# Patient Record
Sex: FEMALE
[De-identification: names, ages, dates, MRNs, and addresses within clinical notes are randomized per-mention and may not be internally consistent; named-entity substitution may affect disease eponyms.]

---

## 2019-02-27 ENCOUNTER — HOSPITAL ENCOUNTER (INPATIENT)
Dept: HOSPITAL 53 - M ED | Age: 39
LOS: 3 days | Discharge: HOME | DRG: 642 | End: 2019-03-02
Attending: INTERNAL MEDICINE | Admitting: INTERNAL MEDICINE
Payer: COMMERCIAL

## 2019-02-27 VITALS — HEIGHT: 60 IN | WEIGHT: 120.26 LBS | BODY MASS INDEX: 23.61 KG/M2

## 2019-02-27 DIAGNOSIS — K31.84: ICD-10-CM

## 2019-02-27 DIAGNOSIS — F12.10: ICD-10-CM

## 2019-02-27 DIAGNOSIS — E80.21: Primary | ICD-10-CM

## 2019-02-27 DIAGNOSIS — Z90.49: ICD-10-CM

## 2019-02-27 DIAGNOSIS — F39: ICD-10-CM

## 2019-02-27 DIAGNOSIS — Z79.899: ICD-10-CM

## 2019-02-27 DIAGNOSIS — E87.6: ICD-10-CM

## 2019-02-27 LAB
ALBUMIN SERPL BCG-MCNC: 4.2 GM/DL (ref 3.2–5.2)
ALT SERPL W P-5'-P-CCNC: 24 U/L (ref 12–78)
B-HCG SERPL QL: NEGATIVE
BASOPHILS # BLD AUTO: 0.1 10^3/UL (ref 0–0.2)
BASOPHILS NFR BLD AUTO: 0.4 % (ref 0–1)
BILIRUB CONJ SERPL-MCNC: 0.2 MG/DL (ref 0–0.2)
BILIRUB SERPL-MCNC: 0.7 MG/DL (ref 0.2–1)
BUN SERPL-MCNC: 14 MG/DL (ref 7–18)
CALCIUM SERPL-MCNC: 9.3 MG/DL (ref 8.5–10.1)
CHLORIDE SERPL-SCNC: 110 MEQ/L (ref 98–107)
CO2 SERPL-SCNC: 18 MEQ/L (ref 21–32)
CREAT SERPL-MCNC: 0.75 MG/DL (ref 0.55–1.3)
EOSINOPHIL # BLD AUTO: 0 10^3/UL (ref 0–0.5)
EOSINOPHIL NFR BLD AUTO: 0.1 % (ref 0–3)
GFR SERPL CREATININE-BSD FRML MDRD: > 60 ML/MIN/{1.73_M2} (ref 60–?)
GLUCOSE SERPL-MCNC: 170 MG/DL (ref 70–100)
HCT VFR BLD AUTO: 43.4 % (ref 36–47)
HGB BLD-MCNC: 14.8 G/DL (ref 12–15.5)
LIPASE SERPL-CCNC: 221 U/L (ref 73–393)
LYMPHOCYTES # BLD AUTO: 1.6 10^3/UL (ref 1.5–4.5)
LYMPHOCYTES NFR BLD AUTO: 10.2 % (ref 24–44)
MCH RBC QN AUTO: 33.2 PG (ref 27–33)
MCHC RBC AUTO-ENTMCNC: 34.1 G/DL (ref 32–36.5)
MCV RBC AUTO: 97.3 FL (ref 80–96)
MONOCYTES # BLD AUTO: 0.5 10^3/UL (ref 0–0.8)
MONOCYTES NFR BLD AUTO: 3.1 % (ref 0–5)
NEUTROPHILS # BLD AUTO: 13.1 10^3/UL (ref 1.8–7.7)
NEUTROPHILS NFR BLD AUTO: 85.7 % (ref 36–66)
PLATELET # BLD AUTO: 360 10^3/UL (ref 150–450)
POTASSIUM SERPL-SCNC: 4.1 MEQ/L (ref 3.5–5.1)
PROT SERPL-MCNC: 7.5 GM/DL (ref 6.4–8.2)
RBC # BLD AUTO: 4.46 10^6/UL (ref 4–5.4)
SODIUM SERPL-SCNC: 141 MEQ/L (ref 136–145)
WBC # BLD AUTO: 15.3 10^3/UL (ref 4–10)

## 2019-02-27 NOTE — REPVR
EXAM: 

 CT Abdomen and Pelvis With Contrast 



EXAM DATE/TIME: 

 2/27/2019 10:35 PM 



CLINICAL HISTORY: 

 38 years old, female; Pain; Abdominal pain; Generalized; Additional info: 

Intractable vomiting, leukocytosis 



TECHNIQUE: 

 Axial computed tomography images of the abdomen and pelvis with intravenous 

contrast. 

 All CT scans at this facility use at least one of these dose optimization 

techniques: automated exposure control; mA and/or kV adjustment per patient 

size (includes targeted exams where dose is matched to clinical indication); or 

iterative reconstruction. 

 Coronal and sagittal reformatted images were created and reviewed. 



CONTRAST: 

 Contrast Material: 100 ml of iso; Contrast Route: ac 



COMPARISON: 

 CT ABD/PEL W/IV CONTRAST ONLY 1/14/2019 8:57 PM 



FINDINGS: 

 Lower thorax:  Clear appearing lung bases. The heart is normal in size. There 

is no pericardial effusion. 



ABDOMEN: 

 Liver:  Normal liver. 

 Gallbladder and bile ducts:  There are surgical clips in the gallbladder fossa 

and the patient is status post cholecystectomy. Normal common bile duct. 

 Pancreas:  Normal pancreas. 

 Spleen:  Normal spleen. 

 Adrenals:  Normal adrenal glands. 

 Kidneys and ureters:  There is enhancement of the kidneys.  No evidence of 

obstruction of the right and left ureter. No evidence of hydronephrosis. 

 Stomach and bowel:  The cecum is in the right pelvis. Normal-appearing small 

bowel. 

 Appendix:  Normal-appearing appendix. 



PELVIS: 

 Bladder:  Normal appearing urinary bladder. 

 Reproductive:  There is an IUD within the center of the uterus. 2 CM 

follicular cyst right ovary. There are small follicular cysts left ovary. 



ABDOMEN and PELVIS: 

 Intraperitoneal space:  There is no evidence of pneumoperitoneum. There is no 

evidence of free fluid in the abdomen or the pelvis. 

 Bones/joints:  Mild posterior disc protrusion L4-L5 and L5-S1. 

 Soft tissues: Unremarkable. 

 Vasculature:  There is opacification of the aorta and the aorta is normal in 

size. There is opacification of the SMV and SMA. 

 Lymph nodes:  There is no evidence of lymphadenopathy. 



IMPRESSION: 

1. No evidence of bowel obstruction. 

2. No evidence of inflammation. 

3. 2 CM follicular cyst right ovary. 



Electronically signed by: Deangelo Sweeney On 02/27/2019  23:51:00 PM

## 2019-02-28 VITALS — SYSTOLIC BLOOD PRESSURE: 166 MMHG | DIASTOLIC BLOOD PRESSURE: 88 MMHG

## 2019-02-28 VITALS — DIASTOLIC BLOOD PRESSURE: 89 MMHG | SYSTOLIC BLOOD PRESSURE: 159 MMHG

## 2019-02-28 RX ADMIN — METOCLOPRAMIDE PRN MG: 5 INJECTION, SOLUTION INTRAMUSCULAR; INTRAVENOUS at 17:06

## 2019-02-28 RX ADMIN — METOCLOPRAMIDE PRN MG: 5 INJECTION, SOLUTION INTRAMUSCULAR; INTRAVENOUS at 03:59

## 2019-02-28 RX ADMIN — SODIUM CHLORIDE SCH UNITS: 4.5 INJECTION, SOLUTION INTRAVENOUS at 05:47

## 2019-02-28 RX ADMIN — DEXTROSE AND SODIUM CHLORIDE SCH MLS/HR: 5; 900 INJECTION, SOLUTION INTRAVENOUS at 13:30

## 2019-02-28 RX ADMIN — SODIUM CHLORIDE SCH UNITS: 4.5 INJECTION, SOLUTION INTRAVENOUS at 15:07

## 2019-02-28 RX ADMIN — ONDANSETRON PRN MG: 2 INJECTION INTRAMUSCULAR; INTRAVENOUS at 22:34

## 2019-02-28 RX ADMIN — SODIUM CHLORIDE SCH UNITS: 4.5 INJECTION, SOLUTION INTRAVENOUS at 20:06

## 2019-02-28 RX ADMIN — ONDANSETRON PRN MG: 2 INJECTION INTRAMUSCULAR; INTRAVENOUS at 14:59

## 2019-02-28 RX ADMIN — DEXTROSE AND SODIUM CHLORIDE SCH MLS/HR: 5; 900 INJECTION, SOLUTION INTRAVENOUS at 02:24

## 2019-02-28 NOTE — IPNPDOC
Text Note


Date of Service


The patient was seen on 2/28/19.





NOTE


S: pt examined at bedside in ER. Curled up in bed, states she still feels guerline

seous. Has had n/v and abd cramping for past 2 days. No hematemesis, melena, or 

hematochezia. States "I get like this every time my porphyria flares." 





PE:


GENERAL: Resting in bed, NAD, A&Ox3


HEENT: NCAT, EOMI. Dry mucous membranes


NECK: Supple. No JVD


CARDIAC: Regular rate and rhythm. No audible murmurs, clicks, gallops.


LUNGS: Equal chest rise bilaterally. No w/r/r


ABDOMEN: Soft, NT, ND. Hypoactive bowel sounds. No guarding, rebound, rigidity. 

No masses or hepatomegaly


EXTREMITIES: 2+ radial pulses b/l. No edema, c/c


SKIN: No visible lesions or ulcerations





A/P:


n/v likely 2/2 porphyria 


per pt, has had multiple similar cyclical episodes over past 10 yrs


afebrile. Imaging neg on admission. Continue supportive care and D5NS. Consider 

switching to D10NS if no improvement. Glucose loading has helped in past


porphyria labs pending. We do not have other outside records to assist with 

specifying the type of porphyria that she has been diagnosed with


pt normally follows with Dr. Andrade, who has been consulted. Appreciate input. 

Recommends continuing IVF and consider Hematin


continue neurochecks & seizure precautions. Hemodynamically stable





leukocytosis


likely 2/2 acutely ill state. Afebrile and otherwise stable


monitor and tx underlying cause





idiopathic gastroparesis


etiology unclear. Diagnosed last admission 1/19 gastric emptying study. EGD at 

that point was negative


is due to f/u with Dr. Gabriel


continue Reglan





mood disorder


continue xanax prn & fluoxetine





DVT ppx: heparin sc


DISPO: pending clinical improvement





VS,Fishbone, I+O


VS, Fishbone, I+O


Laboratory Tests


2/27/19 19:48








Red Blood Count 4.46, Mean Corpuscular Volume 97.3 H, Mean Corpuscular 

Hemoglobin 33.2 H, Mean Corpuscular Hemoglobin Concent 34.1, Red Cell 

Distribution Width 12.5, Neutrophils (%) (Auto) 85.7 H, Lymphocytes (%) (Auto) 

10.2 L, Monocytes (%) (Auto) 3.1, Eosinophils (%) (Auto) 0.1, Basophils (%) 

(Auto) 0.4, Neutrophils # (Auto) 13.1 H, Lymphocytes # (Auto) 1.6, Monocytes # 

(Auto) 0.5, Eosinophils # (Auto) 0.0, Basophils # (Auto) 0.1








Vital Signs








  Date Time  Temp Pulse Resp B/P (MAP) Pulse Ox O2 Delivery O2 Flow Rate FiO2


 


2/28/19 14:00 99.8 81 18 166/88 (114) 97   


 


2/28/19 13:06      Room Air  











GME ATTESTATION


GME ATTESTATION


My faculty preceptor for this patient encounter was physically present during 

the encounter and was fully available. All aspects of the patient interview, 

examination, medical decision making process, and medical care plan development 

were reviewed and approved by the faculty preceptor. The faculty preceptor is 

aware and concurs with the plan as stated in the body of this note and will 

attest to such by his/her cosignature.











SEBASTIÁN ANDERSON DO              Feb 28, 2019 19:30

## 2019-02-28 NOTE — HPE
DATE OF ADMISSION:  02/28/2019

 

CHIEF COMPLAINT:  Patient presents with diffuse crampy abdominal pain and day 
and

a half of multiple episodes of nausea and vomiting.

 

HISTORY OF PRESENT ILLNESS:  Patient is a 38-year-old female with significant

past medical history of mood disorder and porphyria believed to be acute

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

exactly she has.  She presents to the emergency room with a day and a half of

crampy abdominal pain, tingling in the fingers, multiple episodes of what she

states is yellowish vomitus, unable to tolerate oral.  She denies any seizures.

She denies any cutaneous manifestations.  She denies any chest pain, shortness 
of

breath, fevers or chills.  She admits to marijuana use.  She is currently not on

a menstrual cycle.  She has started no new medication.  The reason for the 
attack

of her porphyria may possibly be secondary to marijuana use.  CT was negative.

Lipase was negative as well as normal LFTs, abdominal labs were negative.

Creatinine also negative.

 

PAST MEDICAL HISTORY:  See history of present illness.

 

PAST SURGICAL HISTORY:

Cholecystectomy.

 

ALLERGIES:  No know drug allergies.

 

HOME MEDICATIONS:

- Xanax

- Zoloft

- Reglan

 

SOCIAL HISTORY:  She denies alcohol or tobacco use.  She admits to elicit drug

use.  States she smokes marijuana yesterday.

 

FAMILY HISTORY:  Noncontributory.

 

REVIEW OF SYSTEMS:  A 12 point review of system was completed all of which were

negative except those listed in the history of present illness.  The patient is

somewhat noncompliant with the exam stating all her information is on the

records.

 

VITALS ON ADMISSION:  Temperature 98.2, pulse 81, respirations 18, blood 
pressure

132/72, sating 98% on room air.

 

PHYSICAL EXAMINATION:  General:  She is in mild distress secondary to abdominal

discomfort.  Head is normocephalic, atraumatic.

Lungs:  Clear to auscultation.

Cardiovascular:  Regular rate and rhythm.  Normal S1, S2.

Neck is supple.  No jugular venous pulse.

Extremities:  No pitting edema or calf tenderness.

Abdomen is soft, no rebound or guarding.  Positive bowel sounds.

Neurological exam:  She appears to be alert and oriented times three.  No focal

deficit.

 

LABS AND IMAGING DONE IN THE EMERGENCY ROOM:  White count 15, hemoglobin and

hematocrit 14/43, platelet count 360.  Chemistries:  BUN and creatinine of

14/0.75, sodium within normal limits.  Urine porphyrobilinogen, uroporphyrin,

coproporphyrins, and ALA sent, pending.  CT of the abdomen and pelvis shows no

evidence of bowel obstruction.  No evidence of inflammation.

 

ASSESSMENT AND PLAN:  Possible flare of porphyria, acute intermittent porphyria

versus hereditary coproporphyria possibly secondary to marijuana use.  There is

no new medication.  No acute infectious process going on.  Will send a 
urinalysis

to assess for any infectious etiology.  Will send plasma porphyrin, urine

porphobilinogen, urine coproporphyrin and ALA to be sent to assess.  Will start

the patient on D5 normal saline.  Will do neuro checks to assess.  Will place 
the

patient on seizures precautions.  Will check sodium and magnesium daily and

replenish as needed.  Will do hematologic consult in the a.m.  Supportive deep 
venous

thrombosis (DVT) prophylaxis, heparin subcutaneous.  Gastroesophageal (GI)

prophylaxis, not indicated.  Will also place the patient on Reglan, Zofran as

needed.

MTDD

## 2019-02-28 NOTE — CR.PDOC
General


Date of Consultation:  2019


Attending Physician:  Tamiko Andrade MD





Consultation


REASON FOR CONSULTATION/CHIEF COMPLAINT: . 





HISTORY OF PRESENT ILLNESS: . The patient is a   38 year old  female  with a  

PMHx of porphyria . The patinet   had  just gotten workd that her  is 

going to be deployed to [place] . She  complianed of    n/v  abdominal pain  and  

discomfort for  2-3  days   before seeking  admission in the   emergency room 


She has  had a recent admission for the  same  complaint   and had  a GI 

evaluation  as well  . She went ot [place]  to  establish with  a new PCP  

but  came back to [place] to care care of her  household things as well   when

she fell ill with the above symptoms . She has had some diarrhea    one - two 

episodes non blood   in the  stool  no tarry stool  





ALLERGIES: Please see below.





HOME MEDICATIONS: Please see below.





PAST MEDICAL HISTORY:


1. [  ].


2. . Porphyria  , type   unknown   possible variagate   





PAST SURGICAL HISTORY: 


1. 


2. 





FAMILY HISTORY:


Father: 


Mother: 


Siblings: 


Children: 


Hereditary Diseases: 


Unexpected deaths due to medical reasons:  





SOCIAL HISTORY:


Marital status and/or living arrangements: [ ]


Children: [1]


Employment: 


Tobacco use:[no ] 


ETOH: 


Illicit drug use: [no]


IV drug use: [no]


Other relevant social factors: 





REVIEW OF SYSTEMS:


CONSTITUTIONAL: .


HEENT: .


CARDIOVASCULAR: [generalized  fatigeu   + headache  dull    no visual loss , neg

 for seizures  ].


RESPIRATORY: neg.


GENITOURINARY:beg [neg   no duysuri a].


MUSCULOSKELETAL: .


GASTROINTESTINAL: [diarrhea  abdominal opain    diffuse   no   loclization    no

 relief  with diarrhea ].


SKIN: [no rashes ].


NEUROLOGICAL: [no seizures ].


PSYCHIATRIC: [anxiety    that worsens  with   being left alone with no support 

with the  baby ].


ENDOCRINE: .


HEMATOLOGIC/LYMPHATIC: [no rashes ].


ALLERGIC/IMMUNOLOGIC: .





PHYSICAL EXAMINATION:


VITAL SIGNS: Please see below.


GENERAL APPEARANCE: [tired   , sweaty ].


HEENT: [NC AT PErrl  eomi  sclera iw  white non icteric    ].


RESPIRATORY: [no SOB   no  wheeze ].


CARDIOVASCULAR: [S1  S2 appreciated  no  murmurs ].


ABDOMEN: [soft  diffuse tnderness  no  ascites  no rebound ].


EXTREMITIES: [no cce  ].


NEUROLOGICAL: [intact  no lesions  noted ].  


PSYCHIATRIC: [anxious   ].





LABORATORY DATA: Please see below.





ASSESSMENT/PLAN: 


1. Hx of  prophyria  .


2. . hx of  persistnet   n/v  with no  etiology  


imprvoed  with   IV hydration with   D5 and D 10 





Plam 


support with the   D5 for now 


Hematin  only if   her symtoms   worsen , + labs for increase in  urinary  

Coproporlhyrins  or porpohyrobilinogens 


if  labs are  indicative of   Porphyria  , type her disease  





May give   250 cc D10   as well  to assees  for alleviation of   symptpms





Vital Signs/I&O





Vital Signs








  Date Time  Temp Pulse Resp B/P (MAP) Pulse Ox O2 Delivery O2 Flow Rate FiO2


 


19 14:00 99.8 81 18 166/88 (114) 97   


 


19 13:06      Room Air  











Laboratory Data


Labs 24H


Laboratory Tests 2


19 19:48: 


Immature Granulocyte % (Auto) 0.5, White Blood Count 15.3H, Red Blood Count 

4.46, Hemoglobin 14.8, Hematocrit 43.4, Mean Corpuscular Volume 97.3H, Mean 

Corpuscular Hemoglobin 33.2H, Mean Corpuscular Hemoglobin Concent 34.1, Red Cell

Distribution Width 12.5, Platelet Count 360, Neutrophils (%) (Auto) 85.7H, 

Lymphocytes (%) (Auto) 10.2L, Monocytes (%) (Auto) 3.1, Eosinophils (%) (Auto) 

0.1, Basophils (%) (Auto) 0.4, Neutrophils # (Auto) 13.1H, Lymphocytes # (Auto) 

1.6, Monocytes # (Auto) 0.5, Eosinophils # (Auto) 0.0, Basophils # (Auto) 0.1, 

Nucleated Red Blood Cells % (auto) 0.0, Anion Gap 13, Glomerular Filtration Rate

> 60.0, Calcium Level 9.3, Aspartate Amino Transf (AST/SGOT) 18, Alanine 

Aminotransferase (ALT/SGPT) 24, Alkaline Phosphatase 55, Total Bilirubin 0.7, 

Direct Bilirubin 0.2, Total Protein 7.5, Albumin 4.2, Albumin/Globulin Ratio 

1.27, Lipase 221, Human Chorionic Gonadotropin, Qual NEGATIVE


19 21:43: 


19 07:20: Magnesium Level 2.0


CBC/BMP


Laboratory Tests


19 19:48








Red Blood Count 4.46, Mean Corpuscular Volume 97.3 H, Mean Corpuscular 

Hemoglobin 33.2 H, Mean Corpuscular Hemoglobin Concent 34.1, Red Cell 

Distribution Width 12.5, Neutrophils (%) (Auto) 85.7 H, Lymphocytes (%) (Auto) 

10.2 L, Monocytes (%) (Auto) 3.1, Eosinophils (%) (Auto) 0.1, Basophils (%) 

(Auto) 0.4, Neutrophils # (Auto) 13.1 H, Lymphocytes # (Auto) 1.6, Monocytes # 

(Auto) 0.5, Eosinophils # (Auto) 0.0, Basophils # (Auto) 0.1





Allergies


Coded Allergies:  


     No Known Allergies (Unverified , 19)





Home Medications


Scheduled


Fluoxetine Hcl (Fluoxetine HCl) 20 Mg Cap, 20 MG PO QHS, (Reported)





Scheduled PRN


Alprazolam (Alprazolam) 0.5 Mg Tab, 0.5 MG PO TID PRN for ANXIETY, (Reported)


Metoclopramide HCl (Metoclopramide HCl) 5 Mg Tab, 5 MG PO TID PRN for NAUSEA, 

(Reported)











Tamiko Andrade MD                2019 18:05

## 2019-03-01 VITALS — SYSTOLIC BLOOD PRESSURE: 140 MMHG | DIASTOLIC BLOOD PRESSURE: 85 MMHG

## 2019-03-01 VITALS — DIASTOLIC BLOOD PRESSURE: 82 MMHG | SYSTOLIC BLOOD PRESSURE: 148 MMHG

## 2019-03-01 VITALS — DIASTOLIC BLOOD PRESSURE: 81 MMHG | SYSTOLIC BLOOD PRESSURE: 132 MMHG

## 2019-03-01 LAB
BUN SERPL-MCNC: 5 MG/DL (ref 7–18)
BUN SERPL-MCNC: 5 MG/DL (ref 7–18)
CALCIUM SERPL-MCNC: 8.8 MG/DL (ref 8.5–10.1)
CALCIUM SERPL-MCNC: 9.3 MG/DL (ref 8.5–10.1)
CHLORIDE SERPL-SCNC: 105 MEQ/L (ref 98–107)
CHLORIDE SERPL-SCNC: 107 MEQ/L (ref 98–107)
CO2 SERPL-SCNC: 24 MEQ/L (ref 21–32)
CO2 SERPL-SCNC: 25 MEQ/L (ref 21–32)
CREAT SERPL-MCNC: 0.5 MG/DL (ref 0.55–1.3)
CREAT SERPL-MCNC: 0.7 MG/DL (ref 0.55–1.3)
GFR SERPL CREATININE-BSD FRML MDRD: > 60 ML/MIN/{1.73_M2} (ref 60–?)
GFR SERPL CREATININE-BSD FRML MDRD: > 60 ML/MIN/{1.73_M2} (ref 60–?)
GLUCOSE SERPL-MCNC: 111 MG/DL (ref 70–100)
GLUCOSE SERPL-MCNC: 133 MG/DL (ref 70–100)
HCT VFR BLD AUTO: 42.8 % (ref 36–47)
HGB BLD-MCNC: 14.4 G/DL (ref 12–15.5)
MAGNESIUM SERPL-MCNC: 2.2 MG/DL (ref 1.8–2.4)
MCH RBC QN AUTO: 32.8 PG (ref 27–33)
MCHC RBC AUTO-ENTMCNC: 33.6 G/DL (ref 32–36.5)
MCV RBC AUTO: 97.5 FL (ref 80–96)
PLATELET # BLD AUTO: 333 10^3/UL (ref 150–450)
POTASSIUM SERPL-SCNC: 3.1 MEQ/L (ref 3.5–5.1)
POTASSIUM SERPL-SCNC: 3.7 MEQ/L (ref 3.5–5.1)
RBC # BLD AUTO: 4.39 10^6/UL (ref 4–5.4)
SODIUM SERPL-SCNC: 138 MEQ/L (ref 136–145)
SODIUM SERPL-SCNC: 141 MEQ/L (ref 136–145)
WBC # BLD AUTO: 18 10^3/UL (ref 4–10)

## 2019-03-01 RX ADMIN — ONDANSETRON PRN MG: 2 INJECTION INTRAMUSCULAR; INTRAVENOUS at 14:20

## 2019-03-01 RX ADMIN — DEXTROSE AND SODIUM CHLORIDE SCH MLS/HR: 5; 900 INJECTION, SOLUTION INTRAVENOUS at 18:17

## 2019-03-01 RX ADMIN — DEXTROSE AND SODIUM CHLORIDE SCH MLS/HR: 5; 900 INJECTION, SOLUTION INTRAVENOUS at 11:21

## 2019-03-01 RX ADMIN — SODIUM CHLORIDE SCH UNITS: 4.5 INJECTION, SOLUTION INTRAVENOUS at 14:20

## 2019-03-01 RX ADMIN — ONDANSETRON PRN MG: 2 INJECTION INTRAMUSCULAR; INTRAVENOUS at 05:37

## 2019-03-01 RX ADMIN — SODIUM CHLORIDE SCH UNITS: 4.5 INJECTION, SOLUTION INTRAVENOUS at 05:37

## 2019-03-01 RX ADMIN — POTASSIUM CHLORIDE SCH MLS/HR: 7.46 INJECTION, SOLUTION INTRAVENOUS at 11:38

## 2019-03-01 RX ADMIN — POTASSIUM CHLORIDE SCH MLS/HR: 7.46 INJECTION, SOLUTION INTRAVENOUS at 08:42

## 2019-03-01 RX ADMIN — SODIUM CHLORIDE SCH UNITS: 4.5 INJECTION, SOLUTION INTRAVENOUS at 21:19

## 2019-03-01 RX ADMIN — METOCLOPRAMIDE PRN MG: 5 INJECTION, SOLUTION INTRAMUSCULAR; INTRAVENOUS at 00:34

## 2019-03-01 RX ADMIN — METOCLOPRAMIDE PRN MG: 5 INJECTION, SOLUTION INTRAMUSCULAR; INTRAVENOUS at 08:42

## 2019-03-01 RX ADMIN — LORAZEPAM PRN MG: 2 INJECTION INTRAMUSCULAR; INTRAVENOUS at 12:10

## 2019-03-01 RX ADMIN — DEXTROSE AND SODIUM CHLORIDE SCH MLS/HR: 5; 900 INJECTION, SOLUTION INTRAVENOUS at 00:05

## 2019-03-02 VITALS — DIASTOLIC BLOOD PRESSURE: 102 MMHG | SYSTOLIC BLOOD PRESSURE: 172 MMHG

## 2019-03-02 VITALS — SYSTOLIC BLOOD PRESSURE: 147 MMHG | DIASTOLIC BLOOD PRESSURE: 82 MMHG

## 2019-03-02 LAB
AMORPH SED URNS QL MICRO: (no result)
APPEARANCE UR: (no result)
BACTERIA UR QL AUTO: (no result)
BILIRUB UR QL STRIP.AUTO: NEGATIVE
BUN SERPL-MCNC: 7 MG/DL (ref 7–18)
CALCIUM SERPL-MCNC: 8.7 MG/DL (ref 8.5–10.1)
CHLORIDE SERPL-SCNC: 105 MEQ/L (ref 98–107)
CO2 SERPL-SCNC: 26 MEQ/L (ref 21–32)
CREAT SERPL-MCNC: 0.7 MG/DL (ref 0.55–1.3)
GFR SERPL CREATININE-BSD FRML MDRD: > 60 ML/MIN/{1.73_M2} (ref 60–?)
GLUCOSE SERPL-MCNC: 121 MG/DL (ref 70–100)
GLUCOSE UR QL STRIP.AUTO: NEGATIVE MG/DL
HCT VFR BLD AUTO: 44.9 % (ref 36–47)
HGB BLD-MCNC: 15.3 G/DL (ref 12–15.5)
HGB UR QL STRIP.AUTO: (no result)
KETONES UR QL STRIP.AUTO: NEGATIVE MG/DL
LEUKOCYTE ESTERASE UR QL STRIP.AUTO: NEGATIVE
MAGNESIUM SERPL-MCNC: 2.1 MG/DL (ref 1.8–2.4)
MCH RBC QN AUTO: 32.6 PG (ref 27–33)
MCHC RBC AUTO-ENTMCNC: 34.1 G/DL (ref 32–36.5)
MCV RBC AUTO: 95.5 FL (ref 80–96)
MUCOUS THREADS URNS QL MICRO: (no result)
NITRITE UR QL STRIP.AUTO: NEGATIVE
PH UR STRIP.AUTO: 8 UNITS (ref 5–9)
PHOSPHATE SERPL-MCNC: 2.3 MG/DL (ref 2.5–4.9)
PLATELET # BLD AUTO: 324 10^3/UL (ref 150–450)
POTASSIUM SERPL-SCNC: 3.2 MEQ/L (ref 3.5–5.1)
PROT UR QL STRIP.AUTO: (no result) MG/DL
RBC # BLD AUTO: 4.7 10^6/UL (ref 4–5.4)
RBC # UR AUTO: 7 /HPF (ref 0–3)
SODIUM SERPL-SCNC: 140 MEQ/L (ref 136–145)
SP GR UR STRIP.AUTO: 1.02 (ref 1–1.03)
SQUAMOUS #/AREA URNS AUTO: 3 /HPF (ref 0–6)
UROBILINOGEN UR QL STRIP.AUTO: 2 MG/DL (ref 0–2)
WBC # BLD AUTO: 12.3 10^3/UL (ref 4–10)
WBC #/AREA URNS AUTO: 3 /HPF (ref 0–3)

## 2019-03-02 RX ADMIN — ONDANSETRON PRN MG: 2 INJECTION INTRAMUSCULAR; INTRAVENOUS at 00:49

## 2019-03-02 RX ADMIN — POTASSIUM CHLORIDE SCH MLS/HR: 7.46 INJECTION, SOLUTION INTRAVENOUS at 08:17

## 2019-03-02 RX ADMIN — SODIUM CHLORIDE SCH UNITS: 4.5 INJECTION, SOLUTION INTRAVENOUS at 05:39

## 2019-03-02 RX ADMIN — DEXTROSE AND SODIUM CHLORIDE SCH MLS/HR: 5; 900 INJECTION, SOLUTION INTRAVENOUS at 04:31

## 2019-03-02 RX ADMIN — LORAZEPAM PRN MG: 2 INJECTION INTRAMUSCULAR; INTRAVENOUS at 09:50

## 2019-03-02 RX ADMIN — POTASSIUM CHLORIDE SCH MLS/HR: 7.46 INJECTION, SOLUTION INTRAVENOUS at 16:02

## 2019-03-02 RX ADMIN — POTASSIUM CHLORIDE SCH MLS/HR: 7.46 INJECTION, SOLUTION INTRAVENOUS at 12:22

## 2019-03-02 RX ADMIN — METOCLOPRAMIDE PRN MG: 5 INJECTION, SOLUTION INTRAMUSCULAR; INTRAVENOUS at 04:23

## 2019-03-02 NOTE — DS.PDOC
Discharge Summary


General


Date of Admission


2019 at 02:05


Date of Discharge


3/2/19


Attending Physician:  TONY BETHEA DO


Specialist/Consultants Involve:  Tamiko Andrade MD





Discharge Summary


PROCEDURES PERFORMED DURING STAY: [None].





ADMITTING DIAGNOSES: 


1. nausea, vomiting





DISCHARGE DIAGNOSES:


1. n/v likely 2/2 acute intermittent porphyria flare


2. leukocytosis 2/2 acutely ill state


3. Hypokalemia 2/2 vomiting


idiopathic gastroparesis


mood disorder





COMPLICATIONS/CHIEF COMPLAINT: Intractable Vomiting.





HISTORY OF PRESENT ILLNESS: 37 yo F presented to ER for n/v for the past 2 days,

 stating she had similar episode about 1-2 months ago, for which she was also 

hospitalized and improved with glucose loading. No blood in vomit. No f/c or 

changes in bowels. She continued to had poor po intake and was admitted.





HOSPITAL COURSE: She was given supportive care and started on D5NS. Pt stated 

she had multiple similar cyclical episodes over past 10 yrs. She was afebrile 

and imaging neg on admission. Has hypokalemia from vomiting, and was 

supplemented. Her Oncologist Dr. Andrade was consulted and recommended plan as 

above, and to consider switching to D10NS if no improvement. Glucose loading has

 helped in past, and helped pt's symptom improve during this stay as well. 

Nausea and vomiting began to improve and she requested to go home, but  

did not believe she was well enough. They discussed it with each other and our 

hospital team, and agreed to advance her diet and see if she tolerated it well, 

which she did. Both pt and her  were content to go home. All questions 

answered. At time of discharge, porphyria labs still pending. Pt was educated on

 results and to stay hydrated and return to ER for emergency. Long-term Hematin 

was recommended, but our hospital does not carry, and pt did indeed improve 

without it. 








DISCHARGE MEDICATIONS: Please see below.


 


ALLERGIES: Please see below.





PHYSICAL EXAMINATION ON DISCHARGE:


VITAL SIGNS: Please see below.


GENERAL: Resting in bed, NAD, A&Ox3


HEENT: NCAT, EOMI. Dry mucous membranes


NECK: Supple. No JVD


CARDIAC: Regular rate and rhythm. No audible murmurs, clicks, gallops.


LUNGS: Equal chest rise bilaterally. No w/r/r


ABDOMEN: Soft, NT, ND. Hypoactive bowel sounds. No guarding, rebound, rigidity. 

No masses or hepatomegaly


EXTREMITIES: 2+ radial pulses b/l. No edema, c/c


SKIN: No visible lesions or ulcerations





LABORATORY DATA: Please see below.





IMAGIN19 CT abd/pelvis: 1. No evidence of bowel obstruction. 2. No 

evidence of inflammation.  3. 2 CM follicular cyst right ovary. 





PROGNOSIS: good





ACTIVITY: [As tolerated].





DIET: as tolerated, stay hydrated





DISPOSITION:  Home, Self-Care.





DISCHARGE INSTRUCTIONS:


1. F/U with PCP w/i 1 week, with Oncologist Dr. Andrade within 2 weeks


2. Return to ER for emergency 








DISCHARGE CONDITION: [Stable].





TIME SPENT ON DISCHARGE: Greater than 35 minutes.





Vital Signs/I&Os





Vital Signs








  Date Time  Temp Pulse Resp B/P (MAP) Pulse Ox O2 Delivery O2 Flow Rate FiO2


 


3/2/19 14:00 97.6 80 17 172/102 (125) 98   


 


19 13:06      Room Air  














I&O- Last 24 Hours up to 6 AM 


 


 3/2/19





 06:00


 


Intake Total 3015 ml


 


Output Total 3775 ml


 


Balance -760 ml











Laboratory Data


Labs 24H


Laboratory Tests 2


3/2/19 05:25: 


Nucleated Red Blood Cells % (auto) 0.0, Anion Gap 9, Glomerular Filtration Rate 

> 60.0, Blood Urea Nitrogen 7, Creatinine 0.70, Sodium Level 140, Potassium 

Level 3.2L, Chloride Level 105, Carbon Dioxide Level 26, Calcium Level 8.7, 

Phosphorus Level 2.3L, Magnesium Level 2.1


3/2/19 09:20: 


Urine Appearance CLOUDYH, Urine Color YELLOW, Urine pH 8.0, Urine Specific 

Gravity 1.016, Urine Protein 1+H, Urine Glucose (UA) NEGATIVE, Urine Ketones 

NEGATIVE, Urine Urobilinogen 2.0H, Urine Bilirubin NEGATIVE, Urine Leukocyte 

Esterase NEGATIVE, Urine Blood 1+H, Urine Nitrite NEGATIVE, Urine WBC (Auto) 3, 

Urine RBC (Auto) 7H, Urine Hyaline Casts (Auto) 0, Urine Bacteria (Auto) 1+H, 

Urine Squamous Epithelial Cells 3, Urine Amorphous Sediment MODERATEH, Urine 

Mucus (Auto) MODERATE, Urine Sperm (Auto)


CBC/BMP


Laboratory Tests


3/2/19 05:25








Red Blood Count 4.70, Mean Corpuscular Volume 95.5, Mean Corpuscular Hemoglobin 

32.6, Mean Corpuscular Hemoglobin Concent 34.1, Red Cell Distribution Width 

12.2, Calcium Level 8.7





Microbiology





Microbiology


3/1/19 Blood Culture - Preliminary, Resulted


         No growth after 24 hours . All specim...





Discharge Medications


Scheduled


Fluoxetine Hcl (Fluoxetine HCl) 20 Mg Cap, 20 MG PO QHS, (Reported)





Scheduled PRN


Alprazolam (Alprazolam) 0.5 Mg Tab, 0.5 MG PO TID PRN for ANXIETY, (Reported)


Metoclopramide HCl (Metoclopramide HCl) 5 Mg Tab, 5 MG PO TID PRN for NAUSEA, 

(Reported)





Allergies


Coded Allergies:  


     No Known Allergies (Unverified , 19)





GME ATTESTATION


GME ATTESTATION


My faculty preceptor for this patient encounter was physically present during 

the encounter and was fully available. All aspects of the patient interview, 

examination, medical decision making process, and medical care plan development 

were reviewed and approved by the faculty preceptor. The faculty preceptor is 

aware and concurs with the plan as stated in the body of this note and will 

attest to such by his/her cosignature.











SEBASTIÁN ANDERSON DO               Mar 2, 2019 19:06

## 2019-03-06 LAB
ERYTHROCYTE [SEDIMENTATION RATE] IN BLOOD: 13 UG/L (ref 0–15)
HEPTA-CP UR-MCNC: 2 UG/L (ref 0–2)
HEXA-CP UR-MCNC: <1 UG/L (ref 0–1)
Lab: 23 UG/L (ref 0–49)
PBG UR-MCNC: 0.8 MG/L (ref 0–2)
PENTA-CP UR-MCNC: 1 UG/L (ref 0–2)
UROPOR UR-MCNC: 7 UG/L (ref 0–20)

## 2019-06-02 ENCOUNTER — HOSPITAL ENCOUNTER (OUTPATIENT)
Dept: HOSPITAL 53 - M ED | Age: 39
Setting detail: OBSERVATION
LOS: 1 days | Discharge: HOME | End: 2019-06-03
Attending: INTERNAL MEDICINE | Admitting: INTERNAL MEDICINE
Payer: COMMERCIAL

## 2019-06-02 VITALS — BODY MASS INDEX: 26.4 KG/M2 | WEIGHT: 134.48 LBS | HEIGHT: 60 IN

## 2019-06-02 VITALS — SYSTOLIC BLOOD PRESSURE: 160 MMHG | DIASTOLIC BLOOD PRESSURE: 90 MMHG

## 2019-06-02 VITALS — SYSTOLIC BLOOD PRESSURE: 140 MMHG | DIASTOLIC BLOOD PRESSURE: 92 MMHG

## 2019-06-02 DIAGNOSIS — F12.10: ICD-10-CM

## 2019-06-02 DIAGNOSIS — R11.2: Primary | ICD-10-CM

## 2019-06-02 DIAGNOSIS — Z79.899: ICD-10-CM

## 2019-06-02 DIAGNOSIS — E87.6: ICD-10-CM

## 2019-06-02 DIAGNOSIS — E80.20: ICD-10-CM

## 2019-06-02 DIAGNOSIS — D72.829: ICD-10-CM

## 2019-06-02 DIAGNOSIS — K31.84: ICD-10-CM

## 2019-06-02 DIAGNOSIS — F17.210: ICD-10-CM

## 2019-06-02 DIAGNOSIS — F41.9: ICD-10-CM

## 2019-06-02 LAB
ALBUMIN SERPL BCG-MCNC: 4.1 GM/DL (ref 3.2–5.2)
ALT SERPL W P-5'-P-CCNC: 43 U/L (ref 12–78)
AMYLASE SERPL-CCNC: 128 U/L (ref 25–115)
B-HCG SERPL QL: NEGATIVE
BASOPHILS # BLD AUTO: 0 10^3/UL (ref 0–0.2)
BASOPHILS NFR BLD AUTO: 0.2 % (ref 0–1)
BILIRUB CONJ SERPL-MCNC: 0.1 MG/DL (ref 0–0.2)
BILIRUB SERPL-MCNC: 0.6 MG/DL (ref 0.2–1)
BUN SERPL-MCNC: 11 MG/DL (ref 7–18)
CALCIUM SERPL-MCNC: 9.3 MG/DL (ref 8.5–10.1)
CHLORIDE SERPL-SCNC: 103 MEQ/L (ref 98–107)
CK MB CFR.DF SERPL CALC: 1.49
CK MB SERPL-MCNC: < 1 NG/ML (ref ?–3.6)
CK SERPL-CCNC: 67 U/L (ref 26–192)
CO2 SERPL-SCNC: 18 MEQ/L (ref 21–32)
CREAT SERPL-MCNC: 0.85 MG/DL (ref 0.55–1.3)
EOSINOPHIL # BLD AUTO: 0 10^3/UL (ref 0–0.5)
EOSINOPHIL NFR BLD AUTO: 0 % (ref 0–3)
EST. AVERAGE GLUCOSE BLD GHB EST-MCNC: 103 MG/DL (ref 60–110)
GFR SERPL CREATININE-BSD FRML MDRD: > 60 ML/MIN/{1.73_M2} (ref 60–?)
GLUCOSE SERPL-MCNC: 183 MG/DL (ref 70–100)
HCT VFR BLD AUTO: 41.3 % (ref 36–47)
HGB BLD-MCNC: 14.7 G/DL (ref 12–15.5)
LIPASE SERPL-CCNC: 208 U/L (ref 73–393)
LYMPHOCYTES # BLD AUTO: 0.8 10^3/UL (ref 1.5–4.5)
LYMPHOCYTES NFR BLD AUTO: 6.6 % (ref 24–44)
MCH RBC QN AUTO: 33.3 PG (ref 27–33)
MCHC RBC AUTO-ENTMCNC: 35.6 G/DL (ref 32–36.5)
MCV RBC AUTO: 93.4 FL (ref 80–96)
MONOCYTES # BLD AUTO: 0.3 10^3/UL (ref 0–0.8)
MONOCYTES NFR BLD AUTO: 2.3 % (ref 0–5)
NEUTROPHILS # BLD AUTO: 11.3 10^3/UL (ref 1.8–7.7)
NEUTROPHILS NFR BLD AUTO: 90.3 % (ref 36–66)
PLATELET # BLD AUTO: 357 10^3/UL (ref 150–450)
POTASSIUM SERPL-SCNC: 3.4 MEQ/L (ref 3.5–5.1)
PROT SERPL-MCNC: 8.4 GM/DL (ref 6.4–8.2)
RBC # BLD AUTO: 4.42 10^6/UL (ref 4–5.4)
SODIUM SERPL-SCNC: 137 MEQ/L (ref 136–145)
TROPONIN I SERPL-MCNC: < 0.02 NG/ML (ref ?–0.1)
WBC # BLD AUTO: 12.5 10^3/UL (ref 4–10)

## 2019-06-02 PROCEDURE — 85025 COMPLETE CBC W/AUTO DIFF WBC: CPT

## 2019-06-02 PROCEDURE — 83735 ASSAY OF MAGNESIUM: CPT

## 2019-06-02 PROCEDURE — 96376 TX/PRO/DX INJ SAME DRUG ADON: CPT

## 2019-06-02 PROCEDURE — 82150 ASSAY OF AMYLASE: CPT

## 2019-06-02 PROCEDURE — 80076 HEPATIC FUNCTION PANEL: CPT

## 2019-06-02 PROCEDURE — 82550 ASSAY OF CK (CPK): CPT

## 2019-06-02 PROCEDURE — 93005 ELECTROCARDIOGRAM TRACING: CPT

## 2019-06-02 PROCEDURE — 96374 THER/PROPH/DIAG INJ IV PUSH: CPT

## 2019-06-02 PROCEDURE — 96375 TX/PRO/DX INJ NEW DRUG ADDON: CPT

## 2019-06-02 PROCEDURE — 81001 URINALYSIS AUTO W/SCOPE: CPT

## 2019-06-02 PROCEDURE — 84484 ASSAY OF TROPONIN QUANT: CPT

## 2019-06-02 PROCEDURE — 99284 EMERGENCY DEPT VISIT MOD MDM: CPT

## 2019-06-02 PROCEDURE — 82553 CREATINE MB FRACTION: CPT

## 2019-06-02 PROCEDURE — 83690 ASSAY OF LIPASE: CPT

## 2019-06-02 PROCEDURE — 36415 COLL VENOUS BLD VENIPUNCTURE: CPT

## 2019-06-02 PROCEDURE — 84703 CHORIONIC GONADOTROPIN ASSAY: CPT

## 2019-06-02 PROCEDURE — 83036 HEMOGLOBIN GLYCOSYLATED A1C: CPT

## 2019-06-02 PROCEDURE — 80053 COMPREHEN METABOLIC PANEL: CPT

## 2019-06-02 PROCEDURE — 80048 BASIC METABOLIC PNL TOTAL CA: CPT

## 2019-06-02 PROCEDURE — 85027 COMPLETE CBC AUTOMATED: CPT

## 2019-06-02 RX ADMIN — POTASSIUM CHLORIDE, DEXTROSE MONOHYDRATE AND SODIUM CHLORIDE SCH MLS/HR: 300; 5; 900 INJECTION, SOLUTION INTRAVENOUS at 16:43

## 2019-06-02 RX ADMIN — METOCLOPRAMIDE SCH MG: 5 INJECTION, SOLUTION INTRAMUSCULAR; INTRAVENOUS at 20:09

## 2019-06-02 RX ADMIN — Medication SCH MLS/HR: at 18:25

## 2019-06-02 RX ADMIN — DEXTROSE MONOHYDRATE SCH MG: 50 INJECTION, SOLUTION INTRAVENOUS at 16:32

## 2019-06-02 RX ADMIN — ONDANSETRON PRN MG: 2 INJECTION INTRAMUSCULAR; INTRAVENOUS at 15:23

## 2019-06-02 NOTE — HPEPDOC
General


Date of Admission


Jun 2, 2019 at 14:50


Date of Service:  Jun 2, 2019


Chief Complaint


The patient is a 38-year-old female admitted with intractable Nausea, Vomiting, 

Porphyria.


Source:  Patient





History of Present Illness


The patient is a 38-year-old female who presents to the year with complaints of 

intractable nausea as well as vomiting ongoing since Friday. The patient has a 

history of porphyria and episodes of nausea and vomiting related to same. She 

also reports a previous history of gastroparesis. In addition, the patient also 

reports a history of smoking small amount of marijuana on a daily basis. 





This time, she reports her symptoms have been ongoing since Friday. Over 10 

episodes of emesis dailynonbloodybilious in nature. Currently, at time of my 

radiation, the patient was still feeling quite nauseated despite treatment with 

Reglan, Benadryl and promethazine in the ER. She had a couple of episodes of 

emesis at the time of my examination. She reports she has not been able to keep 

anything down since Friday. Last bowel movement was on Friday. Denies any 

associated abdominal pain per se, but does report associated heartburn. 





No diarrhea. No dysuria. Patient reports that when she is vomiting, she has 

small amount of urine incontinence. Denies any headache. No change in her 

vision, no dysphagia, no chest pain. No new joint pains or skin rashes. Reports 

some fevers, chills and sweats.





Patient reports that she has been trying to improve her glucose intake after she

was advised to do so since the previous episodes seem to flareup one week after 

her menstrual periods. She reports, that despite the increased glucose intake, 

it does not seem to have helped and she still ended up with the nausea and 

vomiting - LMP was about one week ago.





Given the persistent nausea and vomiting in the ER despite treatment with 

multiple medications as well as couple of rounds of IV fluids, admission was 

requested.





Home Medications


Scheduled


Fluoxetine Hcl (Fluoxetine HCl) 10 Mg Capsule, 10 MG PO QHS, (Reported)


Hyoscyamine Sulfate (Hyoscyamine Sulfate) 0.125 Mg Tab.rapdis, 0.125 MG PO QID, 

(Reported)


Pantoprazole Sodium (Pantoprazole Sodium) 40 Mg Tablet.dr, 40 MG PO DAILY, 

(Reported)


Ranitidine HCl (Ranitidine HCl) 300 Mg Capsule, 1 TAB PO QHS, (Reported)


Trazodone HCl (Trazodone HCl) 50 Mg Tablet, 50 MG PO QHS, (Reported)





Scheduled PRN


Alprazolam (Alprazolam) 0.5 Mg Tab, 0.5 MG PO TID PRN for ANXIETY, (Reported)


Ondansetron HCl (Zofran) 4 Mg Tablet, 4 MG PO TID PRN for NAUSEA OR VOMITING, 

(Reported)





Allergies


Coded Allergies:  


     No Known Allergies (Unverified , 1/14/19)





Past Medical History


Medical History


Porphyria, gastroparesis, marijuana use


Surgical History


Cholecystectomy





Family History


Momskin cancer, Auntpancreatic cancer, unclecolon cancer 2





Social History


* Smoker:  other (has been smoking about 1 pack per week for 24 yearscurrently 

she reports she is trying to quit and does not smoke as much. She refuses 

nicotine patch. )


Alcohol:  Denies


Drugs:  marijuana, other (smokes small amount of marijuana daily to stimulate 

appetite per patient)





A-FIB/CHADSVASC


A-FIB History


Current/History of A-Fib/PAF?:  No





Review of Systems


Other systems


Negative for 10 systems except as noted under history of present illness





Physical Examination


General Exam:  Positive: Alert, Cooperative, Moderate Distress (patient seemed 

to have significant nausea, had a couple of episodes of emesis at the time of my

evaluation and seemed to be in distress secondary to the severe nausea.)


Eye Exam:  Positive: PERRLA


ENT Exam:  Positive: Mucous membr. moist/pink


Chest Exam:  Positive: Clear to auscultation, Normal air movement; 


   Negative: Rales, Rhonchi, Wheezing


Heart Exam:  Positive: Tachycardic, Regular Rhythm, Normal S1, Normal S2; 


   Negative: Gallops, Murmurs, Rubs


Abdomen Exam:  Positive: BS Hypoactive, Soft, Other (minimal tendernessno 

guarding or rigidity.)


Neuro Exam:  Positive: Other (awake, alert, answering questions appropriately 

and moving all 4 extremities)





Vital Signs





Vital Signs








  Date Time  Temp Pulse Resp B/P (MAP) Pulse Ox O2 Delivery O2 Flow Rate FiO2


 


6/2/19 12:03 99.5 100 17 156/67 (96) 99 Room Air  











Laboratory Data


Labs 24H


Laboratory Tests 2


6/2/19 11:06: 


Immature Granulocyte % (Auto) 0.6, White Blood Count 12.5H, Red Blood Count 

4.42, Hemoglobin 14.7, Hematocrit 41.3, Mean Corpuscular Volume 93.4, Mean 

Corpuscular Hemoglobin 33.3H, Mean Corpuscular Hemoglobin Concent 35.6, Red Cell

Distribution Width 13.0, Platelet Count 357, Neutrophils (%) (Auto) 90.3H, 

Lymphocytes (%) (Auto) 6.6L, Monocytes (%) (Auto) 2.3, Eosinophils (%) (Auto) 

0.0, Basophils (%) (Auto) 0.2, Neutrophils # (Auto) 11.3H, Lymphocytes # (Auto) 

0.8L, Monocytes # (Auto) 0.3, Eosinophils # (Auto) 0.0, Basophils # (Auto) 0.0, 

Nucleated Red Blood Cells % (auto) 0.0, Anion Gap 16, Glomerular Filtration Rate

> 60.0, Estimated Mean Plasma Glucose 103, Hemoglobin A1c 5.2, Calcium Level 

9.3, Aspartate Amino Transf (AST/SGOT) 17, Alanine Aminotransferase (ALT/SGPT) 

43, Alkaline Phosphatase 53, Total Bilirubin 0.6, Direct Bilirubin 0.1, Total 

Creatine Kinase 67, Creatine Kinase MB < 1.0, Creatine Kinase MB Relative Index 

1.49, Troponin I < 0.02, Total Protein 8.4H, Albumin 4.1, Albumin/Globulin Ratio

0.95L, Amylase Level 128H, Lipase 208, Human Chorionic Gonadotropin, Qual 

NEGATIVE


6/2/19 12:56: 


Urine Color YELLOW, Urine Appearance HAZY, Urine pH 7.0, Urine Specific Gravity 

1.018, Urine Protein 1+H, Urine Glucose (UA) 2+H, Urine Ketones 2+H, Urine Blood

NEGATIVE, Urine Nitrite NEGATIVE, Urine Bilirubin NEGATIVE, Urine Urobilinogen 

0.2, Urine Leukocyte Esterase NEGATIVE, Urine WBC (Auto) 2, Urine RBC (Auto) 

13H, Urine Hyaline Casts (Auto) 0, Urine Bacteria (Auto) NEGATIVE, Urine 

Squamous Epithelial Cells 4, Urine Mucus (Auto) SMALL, Urine Sperm (Auto)


CBC/BMP


Laboratory Tests


6/2/19 11:06








Red Blood Count 4.42, Mean Corpuscular Volume 93.4, Mean Corpuscular Hemoglobin 

33.3 H, Mean Corpuscular Hemoglobin Concent 35.6, Red Cell Distribution Width 

13.0, Neutrophils (%) (Auto) 90.3 H, Lymphocytes (%) (Auto) 6.6 L, Monocytes (%)

(Auto) 2.3, Eosinophils (%) (Auto) 0.0, Basophils (%) (Auto) 0.2, Neutrophils # 

(Auto) 11.3 H, Lymphocytes # (Auto) 0.8 L, Monocytes # (Auto) 0.3, Eosinophils #

(Auto) 0.0, Basophils # (Auto) 0.0





 Assessment/Plan


12-lead EKG done in the ER was personally reviewedappears to show sinus rhythm 

with sinus arrhythmia, heart rate 99, possible left atrial enlargement








Intractable nausea and vomiting possibly secondary to porphyria flare:


-Nothing by mouth, D5 NS with 40 KCl @ 100 mL per hour


-prn Zofran, scheduled Reglan


-Patient does report that hot showers seem to help with the symptomsshe may 

have a component of cyclical vomiting syndromeadvised patient regarding 

quitting marijuana smoking


-At this time, patient has not spiked any feversif she does have a high-grade 

fever, we will send of blood cultures and she may benefit from imaging of her 

abdomen/pelvis. Otherwise, we will treat as her usual porphyria flare if she 

does not develop any clinically concerning symptoms.





Hypokalemia:


-Replete via IV fluids





Gastroparesis:


-IV Reglan





Leukocytosis:


-Suspect related to dehydration from persistent vomiting and possibly some 

reactive leukocytosis


-No obvious infection noted


-Recheck in a.m.


-UA was negative, lungs are clear





Anxiety:


-Hold Xanax


-prn low-dose IV Ativan





Nicotine abuse/cigarette smoking:


-Patient has been counseled regarding quitting smoking


-She does not wish to use a nicotine patch





Daily marijuana use:


-Counseled regarding quitting same





GI/DVT prophylaxis:


-PPI/famotidine, SCDs





CODE STATUS:


-Full code per my discussion with the patient





Disposition:


-Admit as an observation status to the hospital. Anticipated length of stay less

than 2 midnights. Anticipate eventual discharge home once nausea/vomiting 

improves and able to tolerate oral intake.





Plan / VTE


VTE Prophylaxis Ordered?:  Yes











OTILIO RAI MD               Jun 2, 2019 15:48

## 2019-06-02 NOTE — ECGEPIP
[facility] - ED

                                       

                                       Test Date:    2019

Pat Name:     DARSHAN LONGORIA               Department:   

Patient ID:   [id number]                 Room:         0103

Gender:       Female                   Technician:   [email or web address]

:          1980               Requested By: KALPESH PEREZ PA-C

Order Number: [id number]     Reading MD:   Maja Lundborg-Gray

                                 Measurements

Intervals                              Axis          

Rate:         99                       P:            73

WY:           137                      QRS:          71

QRSD:         93                       T:            72

QT:           365                                    

QTc:          468                                    

                           Interpretive Statements

SINUS RHYTHM WITH MARKED SINUS ARRHYTHMIA

POSSIBLE LEFT ATRIAL ENLARGEMENT

PROLONGED QTC

NONSPECIFIC ST T WAVE CHANGES

 

NO OLD ECG FOR COMPARISON

Electronically Signed on 2019 19:31:00 EDT by Maja Lundborg-Gray

## 2019-06-03 VITALS — DIASTOLIC BLOOD PRESSURE: 87 MMHG | SYSTOLIC BLOOD PRESSURE: 149 MMHG

## 2019-06-03 VITALS — DIASTOLIC BLOOD PRESSURE: 100 MMHG | SYSTOLIC BLOOD PRESSURE: 180 MMHG

## 2019-06-03 VITALS — SYSTOLIC BLOOD PRESSURE: 198 MMHG | DIASTOLIC BLOOD PRESSURE: 110 MMHG

## 2019-06-03 VITALS — SYSTOLIC BLOOD PRESSURE: 136 MMHG | DIASTOLIC BLOOD PRESSURE: 85 MMHG

## 2019-06-03 VITALS — DIASTOLIC BLOOD PRESSURE: 105 MMHG | SYSTOLIC BLOOD PRESSURE: 198 MMHG

## 2019-06-03 LAB
ALBUMIN SERPL BCG-MCNC: 3.9 GM/DL (ref 3.2–5.2)
ALT SERPL W P-5'-P-CCNC: 36 U/L (ref 12–78)
BILIRUB SERPL-MCNC: 0.9 MG/DL (ref 0.2–1)
BUN SERPL-MCNC: 5 MG/DL (ref 7–18)
CALCIUM SERPL-MCNC: 9.2 MG/DL (ref 8.5–10.1)
CHLORIDE SERPL-SCNC: 108 MEQ/L (ref 98–107)
CO2 SERPL-SCNC: 22 MEQ/L (ref 21–32)
CREAT SERPL-MCNC: 0.63 MG/DL (ref 0.55–1.3)
GFR SERPL CREATININE-BSD FRML MDRD: > 60 ML/MIN/{1.73_M2} (ref 60–?)
GLUCOSE SERPL-MCNC: 120 MG/DL (ref 70–100)
HCT VFR BLD AUTO: 42.4 % (ref 36–47)
HGB BLD-MCNC: 14.8 G/DL (ref 12–15.5)
MAGNESIUM SERPL-MCNC: 2.2 MG/DL (ref 1.8–2.4)
MCH RBC QN AUTO: 33.6 PG (ref 27–33)
MCHC RBC AUTO-ENTMCNC: 34.9 G/DL (ref 32–36.5)
MCV RBC AUTO: 96.1 FL (ref 80–96)
PLATELET # BLD AUTO: 322 10^3/UL (ref 150–450)
POTASSIUM SERPL-SCNC: 3.6 MEQ/L (ref 3.5–5.1)
PROT SERPL-MCNC: 7.3 GM/DL (ref 6.4–8.2)
RBC # BLD AUTO: 4.41 10^6/UL (ref 4–5.4)
SODIUM SERPL-SCNC: 140 MEQ/L (ref 136–145)
WBC # BLD AUTO: 13.8 10^3/UL (ref 4–10)

## 2019-06-03 RX ADMIN — Medication SCH MLS/HR: at 18:00

## 2019-06-03 RX ADMIN — ONDANSETRON PRN MG: 2 INJECTION INTRAMUSCULAR; INTRAVENOUS at 00:37

## 2019-06-03 RX ADMIN — Medication SCH MLS/HR: at 05:47

## 2019-06-03 RX ADMIN — DEXTROSE MONOHYDRATE SCH MG: 50 INJECTION, SOLUTION INTRAVENOUS at 15:21

## 2019-06-03 RX ADMIN — METOCLOPRAMIDE SCH MG: 5 INJECTION, SOLUTION INTRAMUSCULAR; INTRAVENOUS at 04:15

## 2019-06-03 RX ADMIN — METOCLOPRAMIDE SCH MG: 5 INJECTION, SOLUTION INTRAMUSCULAR; INTRAVENOUS at 12:10

## 2019-06-03 RX ADMIN — POTASSIUM CHLORIDE, DEXTROSE MONOHYDRATE AND SODIUM CHLORIDE SCH MLS/HR: 300; 5; 900 INJECTION, SOLUTION INTRAVENOUS at 02:30

## 2019-06-03 NOTE — DS.PDOC
Discharge Summary


General


Date of Admission


Jun 2, 2019 at 14:50


Date of Discharge


6/3/2019





Discharge Summary


PROCEDURES PERFORMED DURING STAY: 


[None].





ADMITTING DIAGNOSES / DISCHARGE DIAGNOSES:


Intractable nausea and vomiting possibly secondary to porphyria flare:


Hypokalemia:


Gastroparesis:


Leukocytosis:


Anxiety:


Nicotine abuse/cigarette smoking:


Daily marijuana use:


GI/DVT prophylaxis:





COMPLICATIONS/CHIEF COMPLAINT: 


Nausea and vomiting 





HISTORY OF PRESENT ILLNESS: 


Patient is a 38-year-old female with a past medical history of porphyria, 

gastroparesis, marijuana use, who presented to the emergency room with 

complaints of intractable nausea and vomiting. Vision is reported several 

episodes of vomiting greater than 10 without any blood. She denied any abdominal

 pain. Has not expense any fevers as an outpatient. Patient was admitted to 

hospitalist service for further evaluation and treatment.





HOSPITAL COURSE: 


Intractable nausea and vomiting possibly secondary to porphyria flare:


- Clinically has had significant resolution of nausea and vomiting


- Physical is non-revealing


- Remains afebrile


- Mild leukocytosis


- Advised smoking cessation of marijuana and cigarette 


- c/w Zofran as needed





s/p Hypokalemia:





Gastroparesis:


- c/w IV Reglan





Leukocytosis - possibly 2/2 reactive leukocytosis; less likely 2/2 infectious 

etiology 


- No obvious infection noted


- Remains afebrile 


- UA was negative





Anxiety:


- Hold Xanax


- prn low-dose IV Ativan





Nicotine abuse/cigarette smoking:


- Patient has been counseled regarding quitting smoking


- She does not wish to use a nicotine patch





Daily marijuana use:


- Counseled regarding quitting same





GI/DVT prophylaxis:


- PPI/famotidine, SCDs





DISCHARGE MEDICATIONS: 


Please see below.


 


ALLERGIES: 


Please see below.





PHYSICAL EXAMINATION ON DISCHARGE:


Vitals (See below)


General: Lying in bed, no acute distress, comfortable, AAOx3


HEENT: NC, AT


CVS: RRR, +S1S2


Lungs: Fair air entry b/l, -w/r/r


Abdomen: Soft, ND, NT


Extremities: - Edema, - Calf tenderness





LABORATORY DATA: 


Please see below.





ACTIVITY: 


[As tolerated].





DISCHARGE PLAN: 


Follow up with [facility] within 7 days


Remain complaint with treatmetn plan and medications


Return to the ER if you experience any problems 





DISPOSITION:


Home 





DISCHARGE CONDITION: 


[Stable].





TIME SPENT ON DISCHARGE: 


32 minutes





Vital Signs/I&Os





Vital Signs








  Date Time  Temp Pulse Resp B/P (MAP) Pulse Ox O2 Delivery O2 Flow Rate FiO2


 


6/3/19 09:00 99.1 74 19 136/85 (102) 98   


 


6/2/19 16:04      Room Air  














I&O- Last 24 Hours up to 6 AM 


 


 6/3/19





 06:00


 


Intake Total 3360 ml


 


Output Total 1500 ml


 


Balance 1860 ml











Laboratory Data


Labs 24H


Laboratory Tests 2


6/3/19 04:53: Bedside Glucose (Misc Panel) 129H


6/3/19 06:25: 


Nucleated Red Blood Cells % (auto) 0.0, Anion Gap 10, Glomerular Filtration Rate

 > 60.0, Blood Urea Nitrogen 5#L, Creatinine 0.63, Sodium Level 140, Potassium 

Level 3.6, Chloride Level 108H, Carbon Dioxide Level 22, Calcium Level 9.2, 

Aspartate Amino Transf (AST/SGOT) 16, Alanine Aminotransferase (ALT/SGPT) 36, 

Alkaline Phosphatase 46, Total Bilirubin 0.9, Total Protein 7.3, Albumin 3.9, 

Magnesium Level 2.2, Albumin/Globulin Ratio 1.15


CBC/BMP


Laboratory Tests


6/3/19 06:25








Red Blood Count 4.41, Mean Corpuscular Volume 96.1 H, Mean Corpuscular 

Hemoglobin 33.6 H, Mean Corpuscular Hemoglobin Concent 34.9, Red Cell 

Distribution Width 13.2, Calcium Level 9.2, Aspartate Amino Transf (AST/SGOT) 

16, Alanine Aminotransferase (ALT/SGPT) 36, Alkaline Phosphatase 46, Total 

Bilirubin 0.9, Total Protein 7.3, Albumin 3.9


FSBS





Laboratory Tests








Test


 6/3/19


04:53 Range/Units


 


 


Bedside Glucose (Misc Panel) 129   MG/DL











Discharge Medications


Scheduled


Fluoxetine Hcl (Fluoxetine HCl) 10 Mg Capsule, 10 MG PO QHS, (Reported)


Hyoscyamine Sulfate (Hyoscyamine Sulfate) 0.125 Mg Tab.rapdis, 0.125 MG PO QID, 

(Reported)


Pantoprazole Sodium (Pantoprazole Sodium) 40 Mg Tablet.dr, 40 MG PO DAILY, 

(Reported)


Ranitidine HCl (Ranitidine HCl) 300 Mg Capsule, 1 TAB PO QHS, (Reported)


Trazodone HCl (Trazodone HCl) 50 Mg Tablet, 50 MG PO QHS, (Reported)





Scheduled PRN


Alprazolam (Alprazolam) 0.5 Mg Tab, 0.5 MG PO TID PRN for ANXIETY, (Reported)


Ondansetron HCl (Zofran) 4 Mg Tablet, 4 MG PO TID PRN for NAUSEA OR VOMITING, 

(Reported)





Allergies


Coded Allergies:  


     No Known Allergies (Unverified , 1/14/19)











MARIELLA BALL MD                 [place] 3, 2019 15:22